# Patient Record
Sex: FEMALE | Race: WHITE | HISPANIC OR LATINO | Employment: FULL TIME | URBAN - METROPOLITAN AREA
[De-identification: names, ages, dates, MRNs, and addresses within clinical notes are randomized per-mention and may not be internally consistent; named-entity substitution may affect disease eponyms.]

---

## 2017-12-20 ENCOUNTER — ALLSCRIPTS OFFICE VISIT (OUTPATIENT)
Dept: OTHER | Facility: OTHER | Age: 32
End: 2017-12-20

## 2017-12-21 NOTE — PROGRESS NOTES
Assessment   1  Encounter for gynecological examination without abnormal finding (V72 31) (Z01 419)   2  Cervical cancer screening (V76 2) (Z12 4)   3  Encounter for BCP (birth control pills) initial prescription (V25 01) (Z30 011)    Plan   Cervical cancer screening    · (Q) THINPREP TIS PAP AND HPV mRNA E6/E7 REFLEX HPV 16,18/45; Status:Active; Requested for:86Fvi4671;    Perform:[facility]; Order Comments:33 yo V0Z3T0 c LMP 12/8/17, s/p LEEP 2002 for HGILcvx/endocvx; Due:21Qli8104; Ordered; For:Cervical cancer screening; Ordered By:Kim Yusuf;  Encounter for gynecological examination without abnormal finding    · Follow-up visit in 1 year Evaluation and Treatment  Follow-up  Status: Hold For -    Scheduling  Requested for: 26Kiq5818   Ordered; For: Encounter for gynecological examination without abnormal finding; Ordered By: Jerod Ortiz Performed:  Due: 84QCV6519   · Begin or continue regular aerobic exercise   Gradually work up to at least {count1}    sessions of {dur1} of exercise a week ; Status:Complete;   Done: 13YAQ7526 09:48AM   Ordered;For:Encounter for gynecological examination without abnormal finding; Ordered By:Mickey Yusuf;   · Drink plenty of fluids ; Status:Complete;   Done: 23MDY7254 09:48AM   Ordered;For:Encounter for gynecological examination without abnormal finding; Ordered By:Mickey Yusuf;   · Eat a low fat and low cholesterol diet ; Status:Complete;   Done: 87IVZ1264 09:48AM   Ordered;For:Encounter for gynecological examination without abnormal finding; Ordered By:Mickey Yusuf;   · Eat a normal well-balanced diet ; Status:Complete;   Done: 60USE6913 09:48AM   Ordered;For:Encounter for gynecological examination without abnormal finding; Ordered By:Kim Yusuf;   · Eat foods that are high in calcium ; Status:Complete;   Done: 20GJE5262 09:48AM   Ordered;For:Encounter for gynecological examination without abnormal finding; Ordered By:Kim Yusuf;   · How to prevent vaginal infections ; Status:Complete;   Done: 42SJH2479 09:48AM   Ordered;For:Encounter for gynecological examination without abnormal finding; Ordered By:Tamie Yusuf;   · Keep a diary of when and what you eat ; Status:Complete;   Done: 73IYR5919 09:48AM   Ordered;For:Encounter for gynecological examination without abnormal finding; Ordered By:Tamie Yusuf;   · Many types of infections can be passed person to person  To prevent this you need to be    isolated for 7 days ; Status:Complete;   Done: 19IAP9183 09:48AM   Ordered;For:Encounter for gynecological examination without abnormal finding; Ordered By:Tamie Yusuf;   · Some eating tips that can help you lose weight ; Status:Complete;   Done: 57HCA4023    09:48AM   Ordered;For:Encounter for gynecological examination without abnormal finding; Ordered By:Kim Yusuf;   · Stretch and warm up your muscles during the first 10 minutes , then cool down your    muscles for the last 10 minutes of exercise ; Status:Complete;   Done: 71NDO1023    09:48AM   Ordered;For:Encounter for gynecological examination without abnormal finding; Ordered By:Tamie Yusuf;   · These are things you can do to prevent falls in and around the home ; Status:Complete;      Done: 01UDU0373 09:48AM   Ordered;For:Encounter for gynecological examination without abnormal finding; Ordered By:Tamie Yusuf;   · Use a sun block product with an SPF of 15 or more ; Status:Complete;   Done:    80EXE3966 09:48AM   Ordered;For:Encounter for gynecological examination without abnormal finding; Ordered By:Kim Yusuf;   · We encourage all of our patients to exercise regularly    30 minutes of exercise or physical    activity five or more days a week is recommended for children and adults ;    Status:Complete;   Done: 24HEU7114 09:48AM   Ordered;For:Encounter for gynecological examination without abnormal finding; Ordered By:Tamie Yusuf;   · We recommend that you change your eating habits slowly ; Status:Complete;   Done:    70DNE4523 09:48AM   Ordered;For:Encounter for gynecological examination without abnormal finding; Ordered By:Stuart Yusuf;   · We recommend that you create an advance directive ; Status:Complete;   Done:    44CDP6987 09:48AM   Ordered;For:Encounter for gynecological examination without abnormal finding; Ordered By:Stuart Yusuf;   · We recommend that you follow these steps to lower your risk of osteoporosis  ;    Status:Complete;   Done: 23IUL9493 09:48AM   Ordered;For:Encounter for gynecological examination without abnormal finding; Ordered By:Kim Yusuf;   · We recommend you modify your diet to achieve and maintain a healthy weight  Being    overweight may increase your risk for developing health problems such as diabetes,    heart disease, and cancer  Avoid high fat foods and eat a balanced diet rich    in fruits and vegetables  The combination of a reduced-calorie diet and increased    physical activity is recommended  Please let us know if you would like to    learn more about your nutrition and calorie needs, and additional options including    weight loss programs that can help you achieve your goals ; Status:Complete;   Done:    67PQB5769 09:48AM   Ordered;For:Encounter for gynecological examination without abnormal finding; Ordered By:Stuart Yusuf;  PMH: Abnormal Pap smear of cervix    · From  ValACYclovir HCl - 1 GM Oral Tablet TAKE 1 TABLET (1,000 MG) BY    ORAL ROUTE ONCE DAILY To ValACYclovir HCl - 1 GM Oral Tablet TAKE 1 TABLET    DAILY   Rx By: Leonie Maria; Dispense: 90 Days ; #:90 Tablet;  Refill: 3;For: PMH: Abnormal Pap smear of cervix; JORDYN = N; Sent To: [facility] [id number]  PMH: Surveillance of contraceptive pill    · Norethin Ace-Eth Estrad-FE 1-20 MG-MCG Oral Tablet (Loestrin Fe 1/20); TAKE 1    TABLET DAILY   Rx By: Leonie Maria; Dispense: 84 Days ; #:1 X 28 Tablet Disp Pack (3 Disp Packs); Refill: 3;For: PMH: Surveillance of contraceptive pill; JORDYN = N; Sent To: [facility]  [id number]    Discussion/Summary   Currently, she eats an adequate diet and has an adequate exercise regimen  Pap test was done today Breast cancer screening: breast cancer screening is not indicated  Colorectal cancer screening: colorectal cancer screening is not indicated  Osteoporosis screening: bone mineral density testing is not indicated  Advice and education were given regarding aerobic exercise, weight bearing exercise, calcium supplements, reproductive health and contraception  Patient discussion: discussed with the patient  Return to [facility] J[facility]  The patient has the current Goals: Prevention  The patent has the current Barriers: 0  Patient is able to Self-Care  History of Present Illness   HPI: 27 yo A4 c LMP 17 using nothing for [facility] presents for preventive care  GRAVES SPEC   Had done well with ETHAN; able to adhere to regimen; desires to return  Condoms for first pack  same, lifetime > 5 3-4x/dy 2-4x/wk monthly      Review of Systems   no nonmenstrual bleeding-- and-- no dysuria  OB History   Pregnancy History (Brief):      Prior pregnancies: : 7  Para: 1 (full-term),-- 2 (premature),-- 4 (abortions)-- and-- 3 (living)      Delivery type: 3 vaginal      Additional pregnancy history details: 2 elective (s)-- and-- 2 miscarriage(s)       Active Problems   1  Blood type O+ (V49 89) (Z67 40)   2  History of High-risk sexual behavior (V69 2) (Z72 51)   3   Inadequate exercise (V69 0) (Z72 3)    Past Medical History    · History of Abnormal Pap smear of cervix (795 00) (R87 619)   · History of Acute cystitis without hematuria (595 0) (N30 00)   · History of Asthma (493 90) (J45 909)   · History of Bacterial vaginosis (616 10,041 9) (N76 0,B96 89)   · History of Depression (311) (F32 9)   · History of Exposure to sexually transmitted disease (STD) (V01 6) (Z20 2)   · History of Exposure to viral disease (V01 79) (Z20 828)   · History of Genetic screening (V82 79) (Z13 79)   · History of Herpes gingivostomatitis (054 2) (B00 2)   · History of herpes simplex infection (V12 09) (Z86 19)   · History of pregnancy (V13 29)   · History of premature delivery (V23 41) (Z87 51)   · History of Leukorrhea, vaginal, noninfectious (623 5) (N89 8)   · History of Seizure disorder (345 90) (G40 909)     The active problems and past medical history were reviewed and updated today  Surgical History    · History of Cervical Loop Electrosurgical Excision (LEEP)   · History of Surgically Induced      The surgical history was reviewed and updated today  Family History   Maternal Grandmother    · Family history of colon cancer (V16 0) (Z80 0)  Maternal Great Grandmother    · Family history of pancreatic cancer (V16 0) (Z80 0)    Social History    · Former smoker (V15 82) (P17 773)   · 1 ppd: ? - 21 yo  · History of High-risk sexual behavior (V69 2) (Z72 51)   · History of drug use (305 93) (Z87 898)   · THC: ?- 20yo   · Inadequate exercise (V69 0) (Z72 3)   · Occupation   · customer service   · Single  The social history was reviewed and updated today  Current Meds    1  Albuterol Sulfate 108 (90 Base) MCG/ACT AERS; INHALE 2  PUFFS EVERY 4 HOURS     AS NEEDED; Therapy: 02Niz3302 to Recorded   2  Loratadine 10 MG Oral Tablet; Therapy: 90LSE6456 to Recorded   3  Symbicort 80-4 5 MCG/ACT Inhalation Aerosol; INHALE 1 PUFFS Twice daily; Therapy: (Recorded:72Pod5643) to Recorded   4  ValACYclovir HCl - 1 GM Oral Tablet; TAKE 1 TABLET (1,000 MG) BY ORAL ROUTE     ONCE DAILY; Therapy: (Recorded:31Mjd5868) to Recorded    Allergies   1  No Known Drug Allergies  2   Shellfish    Vitals    Recorded: 58QKV6636 73:06RE   Systolic 295, LUE, Sitting   Diastolic 70, LUE, Sitting   Height 5 ft 4 in   Weight 164 lb    BMI Calculated 28 15   BSA Calculated 1 8   LMP 24NXO7446     Physical Exam        Constitutional      General appearance: No acute distress, well appearing and well nourished  Neck      Neck: Normal, supple, trachea midline, no masses  Thyroid: Normal, no thyromegaly  Pulmonary      Respiratory effort: No increased work of breathing or signs of respiratory distress  Genitourinary      External genitalia: Normal and no lesions appreciated  Vagina: Normal, no lesions or dryness appreciated  Urethral meatus: Normal        Bladder: Normal, soft, non-tender and no prolapse or masses appreciated  Cervix: Normal, no palpable masses  -- parous s lesions  Uterus: Normal, non-tender, not enlarged, and no palpable masses  -- NSSC,AF, NT, mobile  Adnexa/parametria: Normal, non-tender and no fullness or masses appreciated  -- no masses or tenderness  Anus, perineum, and rectum: Normal sphincter tone, no masses, and no prolapse  No masses or nodularity      Chest      Breasts: Normal and no dimpling or skin changes noted  -- B nipple piercings removed  Abdomen      Abdomen: Normal, non-tender, and no organomegaly noted  Liver and spleen: No hepatomegaly or splenomegaly  Examination for hernias: No hernias appreciated  Lymphatic      Palpation of lymph nodes in neck, axillae, groin and/or other locations: No lymphadenopathy or masses noted  Skin      Skin and subcutaneous tissue: Normal skin turgor and no rashes         Psychiatric      Orientation to person, place, and time: Normal        Mood and affect: Normal        Signatures    Electronically signed by : Electa Cheadle, M D ; Dec 20 2017  9:49AM EST                       (Author)

## 2017-12-26 ENCOUNTER — LAB CONVERSION - ENCOUNTER (OUTPATIENT)
Dept: OBGYN CLINIC | Facility: CLINIC | Age: 32
End: 2017-12-26

## 2018-01-15 NOTE — PROGRESS NOTES
Assessment    1  Bacterial vaginosis (616 10,041 9) (N76 0,A49  9)    Plan   Bacterial vaginosis    · Tinidazole 500 MG Oral Tablet; take 1 gm po daily for 5 days   Rx By: Doe Tam; Dispense: 5 Days ; #:10 Tablet; Refill: 0; For: Bacterial vaginosis; JORDYN = N; Verified Transmission to [facility]; Last Updated By: [employer], [employer]; 1/22/2016 10:25:47 AM    [place]- POC; Status:Resulted - Requires Verification;   Done: 00TVG0140 12:00AM  BME:74WSB2041;AJXRTNX; Today; For:Bacterial vaginosis; Ordered By:Abel Aguilar;      Discussion/Summary  Discussion Summary:   Positive bacterial vaginosis by wet mt/KOH  Reviewed treatment options with pt, will use Tinidazole 1 gm po daily x 5 days, reviewed no ETOH use  during and 2 days prior and after use  Reviewed weekly use of metrogel if continues recurrent infections  Reviewed mild soap use, laundry products and daily yogurt  Pt to RTO if sx not improved or return  Last annual 11/2015  History of Present Illness  HPI: 28 yo with recurrent bacterial vaginosis sx  She was last treated in 11/2015  Cultures for GC/CT both negative, is in a monogamous relationship  She reports odor but not dsch, denies and itching or burning  Has sx with menses and after sexual relations  Used metronidazole in the past and symptoms do resolve with use, but she noticed she is getting it more frequently  U[facility] OCP[facility] for [facility] and has regular withdrawal bleeds, LMP 1/7/16  Review of Systems   Female ROS: vaginal odor, but no pelvic pain, no vaginal discharge, no nonmenstrual bleeding, no dysuria, no change in urinary frequency and no feelings of urinary urgency  Active Problems    1  Acute cystitis without hematuria (595 0) (N30 00)   2  Bacterial vaginosis (616 10,041 9) (N76 0,A49 9)   3  Encounter for gynecological examination without abnormal finding (V72 31) (Z01 419)   4   Exposure to sexually transmitted disease (STD) (V01 6) (Z20 2)   5  Exposure to viral disease (V01 79) (Z20 828)   6  History of High-risk sexual behavior (V69 2) (Z72 51)   7  Inadequate exercise (V69 0) (Z72 3)   8  Surveillance of contraceptive pill (V25 41) (Z30 41)    Past Medical History    1  History of Abnormal Pap smear of cervix (795 00) (R87 619)   2  History of Asthma (493 90) (J45 909)   3  History of Depression (311) (F32 9)   4  History of Dysuria (788 1) (R30 0)   5  History of Genetic screening (V82 79) (Z13 79)   6  History of Herpes gingivostomatitis (054 2) (B00 2)   7  History of herpes simplex infection (V12 09) (Z86 19)   8  History of pregnancy (V13 29)   9  History of premature delivery (V23 41) (Z87 51)   10  History of Leukorrhea, vaginal, noninfectious (623 5) (N89 8)   11  History of Seizure disorder (345 90) (G40 909)    Surgical History    1  History of Cervical Loop Electrosurgical Excision (LEEP)   2  History of Surgically Induced     Family History    1  Family history of colon cancer (V16 0) (Z80 0)    2  Family history of pancreatic cancer (V16 0) (Z80 0)    Social History    · Former smoker (E67 44) (U20 078)   · History of High-risk sexual behavior (V69 2) (Z72 51)   · History of drug use (305 93) (F19 21)   · Inadequate exercise (V69 0) (Z72 3)   · Occupation   · Single    Current Meds   1  Albuterol Sulfate 108 (90 Base) MCG/ACT AERS; INHALE 2  PUFFS EVERY 4 HOURS AS   NEEDED; Therapy: 97Jvk3265 to Recorded   2  Loratadine 10 MG Oral Tablet; Therapy: 14XDU2457 to Recorded   3  MetroNIDAZOLE 500 MG Oral Tablet; Take 1 tablet twice daily; Therapy: 27BPR3939 to (Last Rx:22Hkm5432)  Requested for: 12Wsc6321 Ordered   4  Norethin Ace-Eth Estrad-FE 1-20 MG-MCG Oral Tablet; TAKE 1 TABLET DAILY; Therapy: 28Xkr3736 to (Evaluate:2016)  Requested for: 40VXD1191; Last   Rx:2015 Ordered   5  Symbicort 80-4 5 MCG/ACT Inhalation Aerosol; INHALE 1 PUFFS Twice daily; Therapy: (Recorded:2015) to Recorded   6  Triamcinolone Acetonide 0 5 % External Cream;   Therapy: 20KJB5141 to Recorded   7  ValACYclovir HCl - 500 MG Oral Tablet; TAKE 1 TABLET TWICE DAILY; Therapy: 75DGE9175 to (Lethaniel Fee)  Requested for: 07VQZ4552; Last   Rx:11Nov2015 Ordered    Allergies    1  No Known Drug Allergies    2  Shellfish    Vitals  Vital Signs [Data Includes: Current Encounter]    Recorded: 51FNM7647 82:59EL   Systolic 299, LUE, Sitting   Diastolic 64, LUE, Sitting   Height 5 ft 4 in   Weight 167 lb    BMI Calculated 28 67   BSA Calculated 1 82   LMP 07-Jan-2016     Physical Exam    Constitutional   General appearance: No acute distress, well appearing and well nourished  Genitourinary   External genitalia: Normal and no lesions appreciated  no lesions  Vagina: Abnormal   white milky dsch  Urethral meatus: Normal     Cervix: Normal, no palpable masses  no lesions  Uterus: Normal, non-tender, not enlarged, and no palpable masses  [address] [place] [address], NT  Adnexa/parametria: Normal, non-tender and no fullness or masses appreciated  no masses, NT     Psychiatric   Orientation to person, place, and time: Normal     Mood and affect: Normal        Signatures   Electronically signed by : SHAWN Jimenez; Jan 22 2016 10:24AM EST                       (Author)    Electronically signed by : IVY Bowles ; Jan 24 2016  7:15PM EST                       (Administrative)

## 2018-01-16 NOTE — RESULT NOTES
Verified Results  Urine HCG- POC 06SUE9291 04:12PM Faye Ambrocio     Test Name Result Flag Reference   Urine HCG Positive         Plan  Suppression of menstruation    · Urine HCG- POC; Status:Complete;   Done: 74GXU1426 04:12PM

## 2018-01-23 VITALS
BODY MASS INDEX: 28 KG/M2 | HEIGHT: 64 IN | SYSTOLIC BLOOD PRESSURE: 110 MMHG | DIASTOLIC BLOOD PRESSURE: 70 MMHG | WEIGHT: 164 LBS

## 2019-09-30 ENCOUNTER — OFFICE VISIT (OUTPATIENT)
Dept: OBGYN CLINIC | Facility: CLINIC | Age: 34
End: 2019-09-30
Payer: COMMERCIAL

## 2019-09-30 VITALS
WEIGHT: 180.6 LBS | DIASTOLIC BLOOD PRESSURE: 62 MMHG | SYSTOLIC BLOOD PRESSURE: 120 MMHG | BODY MASS INDEX: 30.83 KG/M2 | HEIGHT: 64 IN

## 2019-09-30 DIAGNOSIS — Z30.8 ENCOUNTER FOR OTHER CONTRACEPTIVE MANAGEMENT: Primary | ICD-10-CM

## 2019-09-30 PROCEDURE — 99213 OFFICE O/P EST LOW 20 MIN: CPT | Performed by: NURSE PRACTITIONER

## 2019-09-30 RX ORDER — AZELAIC ACID 0.15 G/G
GEL TOPICAL
COMMUNITY
End: 2019-09-30

## 2019-09-30 RX ORDER — MONTELUKAST SODIUM 10 MG/1
10 TABLET ORAL DAILY
COMMUNITY
Start: 2015-04-21 | End: 2021-01-14

## 2019-09-30 RX ORDER — BUDESONIDE AND FORMOTEROL FUMARATE DIHYDRATE 80; 4.5 UG/1; UG/1
1 AEROSOL RESPIRATORY (INHALATION) 2 TIMES DAILY
COMMUNITY
End: 2021-01-14

## 2019-09-30 RX ORDER — NORETHINDRONE ACETATE AND ETHINYL ESTRADIOL AND FERROUS FUMARATE 1MG-20(24)
1 KIT ORAL DAILY
Qty: 84 TABLET | Refills: 1 | Status: SHIPPED | OUTPATIENT
Start: 2019-09-30 | End: 2020-01-29

## 2019-09-30 RX ORDER — OXYCODONE HYDROCHLORIDE AND ACETAMINOPHEN 5; 325 MG/1; MG/1
TABLET ORAL
COMMUNITY
End: 2021-01-14

## 2019-09-30 RX ORDER — TRIAMCINOLONE ACETONIDE 5 MG/G
CREAM TOPICAL
COMMUNITY
Start: 2015-06-17 | End: 2019-09-30

## 2019-09-30 RX ORDER — ACYCLOVIR 400 MG/1
TABLET ORAL
COMMUNITY
End: 2019-09-30

## 2019-09-30 RX ORDER — NORETHINDRONE ACETATE AND ETHINYL ESTRADIOL 1MG-20(21)
1 KIT ORAL DAILY
COMMUNITY
Start: 2015-08-26 | End: 2019-09-30

## 2019-09-30 RX ORDER — LORATADINE 10 MG/1
TABLET ORAL
COMMUNITY
Start: 2015-04-21

## 2019-09-30 RX ORDER — VALACYCLOVIR HYDROCHLORIDE 1 G/1
1 TABLET, FILM COATED ORAL DAILY
COMMUNITY
End: 2019-09-30

## 2019-09-30 RX ORDER — ALBUTEROL SULFATE 90 UG/1
2 AEROSOL, METERED RESPIRATORY (INHALATION) EVERY 4 HOURS PRN
COMMUNITY
Start: 2011-10-05

## 2019-09-30 NOTE — PROGRESS NOTES
Assessment/Plan:    1  Encounter for other contraceptive management  Will discontinue depo provera  Rx sent for low dose OCP  Referral provided for Dr Dalia Slaughter for tubal sterilization  Will return to us for yearly exams     - Ambulatory referral to Obstetrics / Gynecology; Future  - norethindrone-ethinyl estradiol-ferrous fumarate (LOESTIN 24 FE) 1-20 MG-MCG(24) per tablet; Take 1 tablet by mouth daily  Dispense: 84 tablet; Refill: 1      Subjective:      Patient ID: Skyler Shrestha is a 29 y o  female  HPI  PROBLEM VISIT  CC: birth control    28 yo  presents for birth control management  Last seen by Dr Maura Sherwood 2017  Moved to [place] and recently relocated back here  States she did update her yearly in [place], they prescribed depo provera for her  She is due for her next injection early October, however she does not want to to continue with method-- too much break through bleeding  Also desires permanent sterilization  Presents for temporary RX of OCP until she is able to have tubal ligation  Hx of LEEP  for HGSIL;2017 pap/ hpv negative  Asthma, h/o depression, HSV, seizure disorder  Surg: LEEP, D&C- elective ab  Fam hx: mgm-colon ca; mat great gm- pancreatic ca      The following portions of the patient's history were reviewed and updated as appropriate: allergies, current medications, past family history, past medical history, past social history, past surgical history and problem list     Review of Systems   Constitutional: Negative for chills and fever  Genitourinary: Positive for vaginal bleeding  Negative for difficulty urinating, dysuria, frequency, genital sores, menstrual problem, pelvic pain, urgency and vaginal discharge           Objective:    /62 (BP Location: Left arm, Patient Position: Sitting, Cuff Size: Standard)   Ht 5' 4" (1 626 m)   Wt 81 9 kg (180 lb 9 6 oz)   LMP  (LMP Unknown)   BMI 31 00 kg/m²        Physical Exam   Constitutional: She is oriented to person, place, and time  She appears well-developed and well-nourished  No distress  HENT:   Head: Normocephalic and atraumatic  Eyes: Pupils are equal, round, and reactive to light  Pulmonary/Chest: Effort normal    Neurological: She is alert and oriented to person, place, and time  Skin: Skin is warm and dry  Psychiatric: She has a normal mood and affect   Her behavior is normal  Thought content normal

## 2020-01-25 DIAGNOSIS — Z30.8 ENCOUNTER FOR OTHER CONTRACEPTIVE MANAGEMENT: ICD-10-CM

## 2020-01-29 RX ORDER — NORETHINDRONE ACETATE AND ETHINYL ESTRADIOL AND FERROUS FUMARATE 1MG-20(24)
KIT ORAL
Qty: 84 TABLET | Refills: 2 | Status: SHIPPED | OUTPATIENT
Start: 2020-01-29 | End: 2021-01-14

## 2021-01-14 ENCOUNTER — OFFICE VISIT (OUTPATIENT)
Dept: OBGYN CLINIC | Facility: CLINIC | Age: 36
End: 2021-01-14
Payer: COMMERCIAL

## 2021-01-14 VITALS
WEIGHT: 181 LBS | HEART RATE: 72 BPM | SYSTOLIC BLOOD PRESSURE: 108 MMHG | DIASTOLIC BLOOD PRESSURE: 74 MMHG | BODY MASS INDEX: 31.07 KG/M2

## 2021-01-14 DIAGNOSIS — N92.1 MENORRHAGIA WITH IRREGULAR CYCLE: Primary | ICD-10-CM

## 2021-01-14 LAB — SL AMB POCT URINE HCG: NEGATIVE

## 2021-01-14 PROCEDURE — 81025 URINE PREGNANCY TEST: CPT | Performed by: NURSE PRACTITIONER

## 2021-01-14 PROCEDURE — 99213 OFFICE O/P EST LOW 20 MIN: CPT | Performed by: NURSE PRACTITIONER

## 2021-01-14 RX ORDER — TOPIRAMATE 25 MG/1
TABLET ORAL
COMMUNITY
End: 2021-09-15

## 2021-01-14 RX ORDER — HYDROXYZINE HYDROCHLORIDE 25 MG/1
12.5 TABLET, FILM COATED ORAL DAILY PRN
COMMUNITY
Start: 2020-08-25

## 2021-01-14 RX ORDER — MEDROXYPROGESTERONE ACETATE 10 MG/1
10 TABLET ORAL DAILY
Qty: 10 TABLET | Refills: 0 | Status: SHIPPED | OUTPATIENT
Start: 2021-01-14 | End: 2021-09-15

## 2021-01-14 RX ORDER — BUDESONIDE AND FORMOTEROL FUMARATE DIHYDRATE 160; 4.5 UG/1; UG/1
AEROSOL RESPIRATORY (INHALATION)
COMMUNITY
End: 2022-02-23 | Stop reason: SDUPTHER

## 2021-01-14 NOTE — PROGRESS NOTES
Assessment/Plan:    1  Menorrhagia with irregular cycle  Urine pregnancy test negative  Check lab work  Check pelvic US  Will prescribe a ten day course of provera to see if re-regulates her cycle  RV for yearly / pap and to re-evaluate  - POCT urine HCG  - TSH, 3rd generation with Free T4 reflex; Future  - CBC and differential; Future  - US pelvis complete w transvaginal; Future  - medroxyPROGESTERone (PROVERA) 10 mg tablet; Take 1 tablet (10 mg total) by mouth daily  Dispense: 10 tablet; Refill: 0  - TSH, 3rd generation with Free T4 reflex  - CBC and differential      Subjective:      Patient ID: Romie Fermin is a 28 y o  female  HPI  PROBLEM VISIT  CC: abnormal menstrual bleeding    27 yo  presents with complaint of abnormal menstrual bleeding for 3 months  Last seen by me 2019- at that time she was changed from depo provera to an OCP due to BTB  She states that she  only used the pill several months then discontinued it due to her partner having vasectomy  States that her periods were pretty regular for the year until 3 months ago-- bleeds for 7-8 days, stops for a few days, then starts up again  Last time she bled was a few days ago with spotting  Not bleeding today  She reports that her zoloft was increased from 50 to 100 mg daily-- about 3 months ago  She discontinued this and started on Lexapro 10 mg in Dec 2020  No recent check up or lab work with PCP  Hx of LEEP 2002 for HGSIL;2017 pap/ hpv negative  Over due for yearly and repap  BC: spouse has vasectomy    Fam hx: mgm-colon ca; mat great gm- pancreatic ca      The following portions of the patient's history were reviewed and updated as appropriate: allergies, current medications, past family history, past medical history, past social history, past surgical history and problem list     Review of Systems   Constitutional: Negative for chills, fatigue and fever     Respiratory: Negative for cough and shortness of breath  Cardiovascular: Negative for chest pain and palpitations  Gastrointestinal: Negative for abdominal distention, abdominal pain, constipation, diarrhea, nausea and vomiting  Genitourinary: Positive for menstrual problem, pelvic pain (mild cramping) and vaginal bleeding  Negative for difficulty urinating, dysuria, frequency, genital sores, urgency and vaginal discharge (sometimes odor with bleeding)  Musculoskeletal: Negative for arthralgias and myalgias  Objective:    /74 (BP Location: Left arm, Patient Position: Sitting, Cuff Size: Standard)   Pulse 72   Wt 82 1 kg (181 lb)   LMP 01/01/2021 (Approximate)   BMI 31 07 kg/m²      Physical Exam  Constitutional:       General: She is not in acute distress  Appearance: Normal appearance  She is well-developed  She is not ill-appearing or diaphoretic  HENT:      Head: Normocephalic and atraumatic  Eyes:      Pupils: Pupils are equal, round, and reactive to light  Neck:      Musculoskeletal: Neck supple  Abdominal:      General: There is no distension  Palpations: There is no mass  Tenderness: There is no abdominal tenderness  There is no guarding or rebound  Genitourinary:     General: Normal vulva  Exam position: Lithotomy position  Labia:         Right: No rash, tenderness, lesion or injury  Left: No rash, tenderness, lesion or injury  Vagina: No signs of injury and foreign body  No vaginal discharge, erythema, tenderness or bleeding  Cervix: No cervical motion tenderness, discharge or friability  Uterus: Not enlarged and not tender  Adnexa:         Right: No mass or tenderness  Left: No mass or tenderness  Lymphadenopathy:      Cervical: No cervical adenopathy  Neurological:      Mental Status: She is alert

## 2021-01-15 LAB
BASOPHILS # BLD AUTO: 37 CELLS/UL (ref 0–200)
BASOPHILS NFR BLD AUTO: 0.5 %
EOSINOPHIL # BLD AUTO: 110 CELLS/UL (ref 15–500)
EOSINOPHIL NFR BLD AUTO: 1.5 %
ERYTHROCYTE [DISTWIDTH] IN BLOOD BY AUTOMATED COUNT: 12.4 % (ref 11–15)
HCT VFR BLD AUTO: 38.4 % (ref 35–45)
HGB BLD-MCNC: 12.8 G/DL (ref 11.7–15.5)
LYMPHOCYTES # BLD AUTO: 2825 CELLS/UL (ref 850–3900)
LYMPHOCYTES NFR BLD AUTO: 38.7 %
MCH RBC QN AUTO: 30.5 PG (ref 27–33)
MCHC RBC AUTO-ENTMCNC: 33.3 G/DL (ref 32–36)
MCV RBC AUTO: 91.4 FL (ref 80–100)
MONOCYTES # BLD AUTO: 540 CELLS/UL (ref 200–950)
MONOCYTES NFR BLD AUTO: 7.4 %
NEUTROPHILS # BLD AUTO: 3789 CELLS/UL (ref 1500–7800)
NEUTROPHILS NFR BLD AUTO: 51.9 %
PLATELET # BLD AUTO: 318 THOUSAND/UL (ref 140–400)
PMV BLD REES-ECKER: 10.7 FL (ref 7.5–12.5)
RBC # BLD AUTO: 4.2 MILLION/UL (ref 3.8–5.1)
TSH SERPL-ACNC: 2.95 MIU/L
WBC # BLD AUTO: 7.3 THOUSAND/UL (ref 3.8–10.8)

## 2021-01-26 DIAGNOSIS — N92.1 MENORRHAGIA WITH IRREGULAR CYCLE: ICD-10-CM

## 2021-01-28 RX ORDER — MEDROXYPROGESTERONE ACETATE 10 MG/1
TABLET ORAL
Qty: 10 TABLET | Refills: 0 | OUTPATIENT
Start: 2021-01-28

## 2021-09-15 ENCOUNTER — OFFICE VISIT (OUTPATIENT)
Dept: OBGYN CLINIC | Facility: CLINIC | Age: 36
End: 2021-09-15
Payer: COMMERCIAL

## 2021-09-15 VITALS — WEIGHT: 179 LBS | BODY MASS INDEX: 30.56 KG/M2 | HEIGHT: 64 IN

## 2021-09-15 DIAGNOSIS — N89.8 VAGINAL IRRITATION: Primary | ICD-10-CM

## 2021-09-15 DIAGNOSIS — A60.09 HERPES GENITALIS IN WOMEN: ICD-10-CM

## 2021-09-15 DIAGNOSIS — N89.8 VAGINAL LESION: ICD-10-CM

## 2021-09-15 DIAGNOSIS — N88.9 CERVICAL LESION: ICD-10-CM

## 2021-09-15 DIAGNOSIS — N89.8 VAGINAL DISCHARGE: ICD-10-CM

## 2021-09-15 PROCEDURE — 87660 TRICHOMONAS VAGIN DIR PROBE: CPT | Performed by: OBSTETRICS & GYNECOLOGY

## 2021-09-15 PROCEDURE — 87480 CANDIDA DNA DIR PROBE: CPT | Performed by: OBSTETRICS & GYNECOLOGY

## 2021-09-15 PROCEDURE — 99214 OFFICE O/P EST MOD 30 MIN: CPT | Performed by: OBSTETRICS & GYNECOLOGY

## 2021-09-15 PROCEDURE — 87510 GARDNER VAG DNA DIR PROBE: CPT | Performed by: OBSTETRICS & GYNECOLOGY

## 2021-09-15 PROCEDURE — 87529 HSV DNA AMP PROBE: CPT | Performed by: OBSTETRICS & GYNECOLOGY

## 2021-09-15 RX ORDER — FLIBANSERIN 100 MG/1
TABLET, FILM COATED ORAL
COMMUNITY
Start: 2021-08-26 | End: 2022-02-23

## 2021-09-15 RX ORDER — VALACYCLOVIR HYDROCHLORIDE 1 G/1
1000 TABLET, FILM COATED ORAL 2 TIMES DAILY
Qty: 10 TABLET | Refills: 0 | Status: SHIPPED | OUTPATIENT
Start: 2021-09-15 | End: 2022-02-23

## 2021-09-15 RX ORDER — TOPIRAMATE 50 MG/1
TABLET, FILM COATED ORAL
COMMUNITY
Start: 2021-08-14

## 2021-09-15 NOTE — PROGRESS NOTES
Patient is a 39 y o  [id number] with Patient's last menstrual period was 2021  who presents requesting evaluation of vaginal irritation  She reports that her symptoms started last Monday  She saw a physician on line who prescribed her diflucan  She did not have an exam  She took a dose on Monday and Thursday of last week  She reports that her itching symptoms improved, however the discharge became more watery and developed an odor she cannot describe  Vaginal irritation continues  She notes vaginal burning and itching as well and a sensation of dryness  She denies any exacerbating factors  She has not tried any OTC treatments  She denies urinary symptoms  She denies any recent intercourse and reports her symptoms started after menstruation       Past Medical History:   Diagnosis Date    Abnormal Pap smear of cervix     Asthma     Chlamydia infection      approx age 12    Herpes     HPV (human papilloma virus) infection        Past Surgical History:   Procedure Laterality Date    SHOULDER SURGERY         OB History    Para Term  AB Living   8 4     4 4   SAB TAB Ectopic Multiple Live Births   2 2            # Outcome Date GA Lbr Willy/2nd Weight Sex Delivery Anes PTL Lv   8 TAB            7 TAB            6 SAB            5 SAB            4 Para            3 Para            2 Para            1 Para                    Current Outpatient Medications:     Acetaminophen (Tylenol) 325 MG CAPS, Tylenol 325 mg capsule  Take by oral route , Disp: , Rfl:     albuterol (PROVENTIL HFA,VENTOLIN HFA) 90 mcg/act inhaler, Inhale 2 puffs every 4 (four) hours as needed, Disp: , Rfl:     budesonide-formoterol (Symbicort) 160-4 5 mcg/act inhaler, Symbicort 160 mcg-4 5 mcg/actuation HFA aerosol inhaler, Disp: , Rfl:     hydrOXYzine HCL (ATARAX) 25 mg tablet, Take 12 5 mg by mouth daily as needed, Disp: , Rfl:     loratadine (CLARITIN) 10 mg tablet, Take by mouth, Disp: , Rfl:     topiramate (TOPAMAX) 50 MG tablet, , Disp: , Rfl:     Addyi 100 MG TABS, , Disp: , Rfl:     valACYclovir (VALTREX) 1,000 mg tablet, Take 1 tablet (1,000 mg total) by mouth 2 (two) times a day for 5 days, Disp: 10 tablet, Rfl: 0    Allergies   Allergen Reactions    Shellfish Allergy - Food Allergy Itching     [facility] [place] - [id number]: shrimp, crayfish, lobster, crab       Social History     Socioeconomic History    Marital status: Single     Spouse name: None    Number of children: None    Years of education: None    Highest education level: None   Occupational History    None   Tobacco Use    Smoking status: Former Smoker    Smokeless tobacco: Never Used   Substance and Sexual Activity    Alcohol use: Yes     Comment: ocassional    Drug use: Never    Sexual activity: Yes     Partners: Male     Birth control/protection: Injection   Other Topics Concern    None   Social History Narrative    None     Social Determinants of Health     Financial Resource Strain:     Difficulty of Paying Living Expenses:    Food Insecurity:     Worried About Running Out of Food in the Last Year:     Ran Out of Food in the Last Year:    Transportation Needs:     Lack of Transportation (Medical):      Lack of Transportation (Non-Medical):    Physical Activity:     Days of Exercise per Week:     Minutes of Exercise per Session:    Stress:     Feeling of Stress :    Social Connections:     Frequency of Communication with Friends and Family:     Frequency of Social Gatherings with Friends and Family:     Attends [employer] Services:     Active Member of Clubs or Organizations:     Attends Club or Organization Meetings:     Marital Status:    Intimate Partner Violence:     Fear of Current or Ex-Partner:     Emotionally Abused:     Physically Abused:     Sexually Abused:        Family History   Problem Relation Age of Onset    Cancer Maternal Grandfather     Cancer Paternal Grandfather        Review of Systems   Constitutional: Negative for chills, fatigue, fever and unexpected weight change  HENT: Negative for congestion, mouth sores and sore throat  Respiratory: Negative for cough, chest tightness, shortness of breath and wheezing  Cardiovascular: Negative for chest pain and palpitations  Gastrointestinal: Negative for abdominal distention, abdominal pain, constipation, diarrhea, nausea and vomiting  Endocrine: Negative for cold intolerance and heat intolerance  Genitourinary: Positive for vaginal discharge  Negative for dyspareunia, dysuria, genital sores, menstrual problem, pelvic pain, vaginal bleeding and vaginal pain  Musculoskeletal: Negative for arthralgias  Skin: Negative for color change and rash  Neurological: Negative for dizziness, light-headedness and headaches  Hematological: Negative for adenopathy  Height 5' 4" (1 626 m), weight 81 2 kg (179 lb), last menstrual period 09/04/2021  and Body mass index is 30 73 kg/m²  Physical Exam  Constitutional:       General: She is not in acute distress  Appearance: Normal appearance  She is obese  She is not ill-appearing  HENT:      Head: Normocephalic and atraumatic  Eyes:      Extraocular Movements: Extraocular movements intact  Conjunctiva/sclera: Conjunctivae normal    Pulmonary:      Effort: Pulmonary effort is normal    Abdominal:      General: There is no distension  Palpations: Abdomen is soft  There is no mass  Tenderness: There is no abdominal tenderness  There is no guarding or rebound  Hernia: No hernia is present  Musculoskeletal:         General: Normal range of motion  Cervical back: Normal range of motion  Skin:     General: Skin is warm  Findings: No erythema or rash  Neurological:      Mental Status: She is alert and oriented to person, place, and time  Psychiatric:         Mood and Affect: Mood normal          Behavior: Behavior normal          Thought Content:  Thought content normal  Judgment: Judgment normal           vulva: normal external genitalia for age and no lesions, masses, epithelial changes, or exudate  vagina: color pink, rugae  well formed rugae, discharge  watery and yellow/green, non-odiferous and ulcerated lesion noted at introitus on the left side  cervix: parous, lesions  ulcerated at 11 oclock and friable  uterus: NSSC, AF, NT, mobile  adnexa: no masses or tenderness      A/P:  Pt is a 39 y o  [id number] with      Ruth Simper was seen today for vaginal irritation  Diagnoses and all orders for this visit:    Vaginal irritation  -     valACYclovir (VALTREX) 1,000 mg tablet; Take 1 tablet (1,000 mg total) by mouth 2 (two) times a day for 5 days    Vaginal discharge  -     VAGINOSIS DNA PROBE (AFFIRM)  Pt recently treated with diflucan, so affirm obtained    Cervical lesion  -     HSV TYPE 1,2 DNA PCR  -     valACYclovir (VALTREX) 1,000 mg tablet; Take 1 tablet (1,000 mg total) by mouth 2 (two) times a day for 5 days    Vaginal lesion  -     Cancel: HSV TYPE 1,2 DNA PCR  -     HSV TYPE 1,2 DNA PCR  -     valACYclovir (VALTREX) 1,000 mg tablet; Take 1 tablet (1,000 mg total) by mouth 2 (two) times a day for 5 days    Herpes genitalis in women  -     valACYclovir (VALTREX) 1,000 mg tablet; Take 1 tablet (1,000 mg total) by mouth 2 (two) times a day for 5 days      As pt reports a h o HSV although it has been some time since an outbreak, with lesions as described, will err on side of treatment while awaiting results

## 2021-09-17 LAB
CANDIDA RRNA VAG QL PROBE: NEGATIVE
G VAGINALIS RRNA GENITAL QL PROBE: NEGATIVE
T VAGINALIS RRNA GENITAL QL PROBE: POSITIVE

## 2021-09-21 ENCOUNTER — TELEPHONE (OUTPATIENT)
Dept: OBGYN CLINIC | Facility: CLINIC | Age: 36
End: 2021-09-21

## 2021-09-21 DIAGNOSIS — A59.9 TRICHOMONAS INFECTION: Primary | ICD-10-CM

## 2021-09-21 LAB
HSV1 DNA SPEC QL NAA+PROBE: NEGATIVE
HSV2 DNA SPEC QL NAA+PROBE: NEGATIVE

## 2021-09-21 NOTE — TELEPHONE ENCOUNTER
Called pt to relay results of positive trichomonas and to discuss management  LMOm to call back  Please let me know if she calls

## 2021-09-22 LAB
HSV1 DNA SPEC QL NAA+PROBE: NEGATIVE
HSV2 DNA SPEC QL NAA+PROBE: NEGATIVE

## 2021-09-24 RX ORDER — METRONIDAZOLE 500 MG/1
500 TABLET ORAL EVERY 12 HOURS SCHEDULED
Qty: 14 TABLET | Refills: 0 | Status: SHIPPED | OUTPATIENT
Start: 2021-09-24 | End: 2021-10-01

## 2021-09-24 NOTE — TELEPHONE ENCOUNTER
I called the patient back on Wednesday without answer and I called again today  I reviewed with her that both herpes tests were negative, but she does have trichomonas and we reviewed this is a sexually transmitted disease  Pt discloses that she has been sexually active with two partners concurrently  Reviewed that either one of these partners could also be positive and she should notify both of them  Reviewed treatment with flagyl 500 mg BID x 7 days  Reviewed no sexual activity until after she and her partners complete treatment  Additionally we reviewed GI side effects are common and she should not drink any alcohol while taking this medication as it can case severe vomiting  Pt should arrange test of cure  Pt verbalizes understanding  All questions answered

## 2022-02-23 ENCOUNTER — CONSULT (OUTPATIENT)
Dept: PULMONOLOGY | Facility: CLINIC | Age: 37
End: 2022-02-23
Payer: COMMERCIAL

## 2022-02-23 VITALS
HEART RATE: 70 BPM | WEIGHT: 179 LBS | BODY MASS INDEX: 29.82 KG/M2 | HEIGHT: 65 IN | DIASTOLIC BLOOD PRESSURE: 60 MMHG | SYSTOLIC BLOOD PRESSURE: 110 MMHG | OXYGEN SATURATION: 98 % | RESPIRATION RATE: 16 BRPM

## 2022-02-23 DIAGNOSIS — F17.210 NICOTINE DEPENDENCE, CIGARETTES, UNCOMPLICATED: ICD-10-CM

## 2022-02-23 DIAGNOSIS — J45.40 MODERATE PERSISTENT ASTHMA WITHOUT COMPLICATION: Primary | ICD-10-CM

## 2022-02-23 DIAGNOSIS — Z01.818 PREOPERATIVE CLEARANCE: ICD-10-CM

## 2022-02-23 DIAGNOSIS — E66.09 CLASS 1 OBESITY DUE TO EXCESS CALORIES WITHOUT SERIOUS COMORBIDITY WITH BODY MASS INDEX (BMI) OF 30.0 TO 30.9 IN ADULT: ICD-10-CM

## 2022-02-23 PROCEDURE — 99204 OFFICE O/P NEW MOD 45 MIN: CPT | Performed by: INTERNAL MEDICINE

## 2022-02-23 PROCEDURE — 94010 BREATHING CAPACITY TEST: CPT | Performed by: INTERNAL MEDICINE

## 2022-02-23 RX ORDER — BUDESONIDE AND FORMOTEROL FUMARATE DIHYDRATE 160; 4.5 UG/1; UG/1
2 AEROSOL RESPIRATORY (INHALATION) 2 TIMES DAILY
Qty: 30.6 G | Refills: 3 | Status: SHIPPED | OUTPATIENT
Start: 2022-02-23

## 2022-02-23 NOTE — PROGRESS NOTES
Pulmonary Consultation   Barbi Browne 40 y o  female MRN: [id number]    Encounter: [id number]      Reason for consultation:   Bronchial asthma and preop evaluation    Requesting physician:  Ynes Schulz MD       Impressions:   · Bronchial asthma  · Preop evaluation  · Obesity  Recommendations:  · Spirometry  · Symbicort 160/4 5, 2 inhalations twice a day  · Albuterol rescue inhaler 2 inhalations 4 times a day as needed  · The patient has an acceptable risk to proceed with the liposuction surgery as scheduled  · Follow-up in 6 months  Discussion:  The patient has bronchial asthma is well controlled  I have maintained her on the Symbicort 160/4 5, 2 inhalations twice a day  She will use the albuterol rescue inhaler 2 inhalations 4 times a day as needed  A chest x-ray today was normal   Her spirometry was normal   She has an acceptable risk from the pulmonary point of view to proceed with the liposuction procedure scheduled in the near future  I will see her in 6 months in a follow-up visit  History of Present Illness   HPI:  Barbi Browne is a 40 y o  female who is here for evaluation of bronchial asthma and preop clearance  The patient stated that she is scheduled for liposuction procedure to be done next month  The patient has asthma since she was young girl  She has been chronically on Symbicort 160/4 5, 2 inhalations twice a day  When she does not take her Symbicort she feels chest tightness  She denies nocturnal symptoms  Review of systems:  12 point review of systems was completed and was otherwise negative except as listed in HPI        Historical Information   Past Medical History:   Diagnosis Date    Abnormal Pap smear of cervix     Asthma     Chlamydia infection      approx age 12    Herpes     HPV (human papilloma virus) infection      Past Surgical History:   Procedure Laterality Date    SHOULDER SURGERY  2019     Family History   Problem Relation Age of Onset    Cancer Maternal Grandfather     Cancer Paternal Grandfather        Family History:  No family history of chronic lung disease  Social History:        Meds/Allergies   No current facility-administered medications for this visit  (Not in a hospital admission)    Allergies   Allergen Reactions    Shellfish Allergy - Food Allergy Itching     [place] [id number]: shrimp, crayfish, lobster, crab       Vitals: Blood pressure 110/60, pulse 70, resp  rate 16, height 5' 4 5" (1 638 m), weight 81 2 kg (179 lb), SpO2 98 %  ,      Physical exam:        Head/eyes:    Normocephalic, without obvious abnormality, atraumatic,         PERRL, extraocular muscles intact, no scleral icterus    Nose:   Nares normal, septum midline, mucosa normal, no drainage    or sinus tenderness   Throat:   Moist mucous membranes, no thrush   Neck:   Supple, trachea midline, no adenopathy; no carotid    bruit or JVD   Lungs:     Clear breath sounds  No wheezing  Heart:    Regular rate and rhythm, S1 and S2 normal, no murmur, rub   or gallop   Abdomen:     Soft, non-tender, bowel sounds active all four quadrants,     no masses, no organomegaly   Extremities:   Extremities normal, atraumatic, no cyanosis or edema   Skin:   Warm, dry, turgor normal, no rashes or lesions   Neurologic:   CNII-XII intact, normal strength, non-focal             Imaging and other studies: I have personally reviewed pertinent films in PACS chest x-rays reviewed and it showed no acute pulmonary disease  Spirometry is done today in our office and it showed normal airflow and vital capacity          Krysta Che MD

## 2022-03-01 ENCOUNTER — TELEPHONE (OUTPATIENT)
Dept: PULMONOLOGY | Facility: CLINIC | Age: 37
End: 2022-03-01

## 2022-03-10 ENCOUNTER — TELEPHONE (OUTPATIENT)
Dept: OBGYN CLINIC | Facility: CLINIC | Age: 37
End: 2022-03-10

## 2022-03-10 NOTE — TELEPHONE ENCOUNTER
Patient called for an appt for a poss UTI  I told her that we have no openings today , she asked when the nest available appt is, I informed her it is the 16th  She said ok and hung up

## 2022-06-30 ENCOUNTER — TELEPHONE (OUTPATIENT)
Dept: PULMONOLOGY | Facility: CLINIC | Age: 37
End: 2022-06-30

## 2022-06-30 NOTE — TELEPHONE ENCOUNTER
Lvm for pt to schedule a 6m f/u @ [facility] [address] office with Dr Yany Bermudez or DENISSE  Pt was due in August, may have first available appt